# Patient Record
Sex: MALE | Race: WHITE
[De-identification: names, ages, dates, MRNs, and addresses within clinical notes are randomized per-mention and may not be internally consistent; named-entity substitution may affect disease eponyms.]

---

## 2020-09-02 ENCOUNTER — HOSPITAL ENCOUNTER (OUTPATIENT)
Dept: HOSPITAL 95 - ORSCMMR | Age: 25
Discharge: HOME | End: 2020-09-02
Attending: SURGERY
Payer: COMMERCIAL

## 2020-09-02 VITALS — BODY MASS INDEX: 40.59 KG/M2 | HEIGHT: 69.02 IN | WEIGHT: 274.03 LBS

## 2020-09-02 DIAGNOSIS — E66.01: ICD-10-CM

## 2020-09-02 DIAGNOSIS — L05.91: Primary | ICD-10-CM

## 2020-09-02 PROCEDURE — 0HB8XZZ EXCISION OF BUTTOCK SKIN, EXTERNAL APPROACH: ICD-10-PCS | Performed by: SURGERY

## 2020-09-02 NOTE — NUR
Patient up to Ambulate independently. Gait steady.
Discharge instructions reviewed with patient AND PT'S WIFE
. Patient verbalizes understanding.
Copy given to patient to take home.
Discharged via wheelchair to private car for ride home.

## 2020-09-02 NOTE — NUR
DAY SURGERY RN | REPORT TO KURT ALANIS
 
 
NO ISSUES. VSS. A/O. PAIN 3-4 OUT OF 10. NO NAUSEA. TOLERATING PO FLUIDS AND
FOOD.